# Patient Record
Sex: FEMALE
[De-identification: names, ages, dates, MRNs, and addresses within clinical notes are randomized per-mention and may not be internally consistent; named-entity substitution may affect disease eponyms.]

---

## 2018-02-04 ENCOUNTER — HOSPITAL ENCOUNTER (EMERGENCY)
Dept: HOSPITAL 14 - H.ER | Age: 33
LOS: 1 days | Discharge: HOME | End: 2018-02-05
Payer: SELF-PAY
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
## 2018-02-04 DIAGNOSIS — F10.129: Primary | ICD-10-CM

## 2018-02-04 PROCEDURE — 99283 EMERGENCY DEPT VISIT LOW MDM: CPT

## 2018-02-04 PROCEDURE — 82948 REAGENT STRIP/BLOOD GLUCOSE: CPT

## 2018-02-04 PROCEDURE — 81025 URINE PREGNANCY TEST: CPT

## 2018-02-04 NOTE — ED PDOC
HPI: Psych/Substance Abuse


Time Seen by Provider: 02/04/18 23:04


Chief Complaint (Nursing): Alcohol Ingestion


Chief Complaint (Provider): etoh


History Per: Patient, EMS


Additional Complaint(s): 


32-year-old female arrives to emergency department acutely intoxicated. Patient 

was found lying on sidewalk asleep. She is arousable to painful stimuli upon 

arrival to ED.  





Past Medical History


Reviewed: Historical Data, Nursing Documentation, Vital Signs, Unable To Obtain


Vital Signs: 





 Last Vital Signs











Temp  97.6 F   02/04/18 22:58


 


Pulse  107 H  02/04/18 22:58


 


Resp  16   02/04/18 22:58


 


BP  118/77   02/04/18 22:58


 


Pulse Ox  100   02/04/18 22:58














- Family History


Family History: States: No Known Family Hx





- Allergies


Allergies/Adverse Reactions: 


 Allergies











Allergy/AdvReac Type Severity Reaction Status Date / Time


 


aspirin Allergy  ANAPHYLAXIS Verified 02/04/18 23:02


 


onion Allergy  ANAPHYLAXIS Verified 02/05/18 04:45














Review of Systems


ROS Statement: Except As Marked, All Systems Reviewed And Found Negative


Psych: Positive for: Other (etoh)





Physical Exam





- Reviewed


Nursing Documentation Reviewed: Yes


Vital Signs Reviewed: Yes





- Physical Exam


Appears: Positive for: Well, Non-toxic, No Acute Distress


Skin: Negative for: Rash


Eye Exam: Positive for: Normal appearance


Cardiovascular/Chest: Positive for: Regular Rate, Rhythm


Respiratory: Positive for: Normal Breath Sounds.  Negative for: Respiratory 

Distress


Neurologic/Psych: Positive for: Other (Intoxicated, responds to painful stimuli)





- ECG


O2 Sat by Pulse Oximetry: 100


Pulse Ox Interpretation: Normal





Medical Decision Making


Medical Decision Making: 


Impression: Acute alcohol intoxication





Plan:


Glucose POC


BAL





Glucose POC:  99


BAL: 239





1:00 am: Patient is asleep, arousable, vital signs are stable.





3:00 am: Patient is asleep, still intoxicated, slightly more alert, vital signs 

stable





4:30 am: patient is awake, alert, asking for her cell phone and belongings.  

She has steady gait and is asking to go home.  Patient is stable for discharge.





Disposition





- Clinical Impression


Clinical Impression: 


 Alcohol intoxication








- Patient ED Disposition


Is Patient to be Admitted: No





- Disposition


Referrals: 


[facility] [Outside]


Disposition: Routine/Home


Disposition Time: 04:49


Condition: STABLE


Instructions:  Alcohol Intoxication (ED)


Forms:  [facility] (English)

## 2018-02-05 VITALS
SYSTOLIC BLOOD PRESSURE: 100 MMHG | HEART RATE: 95 BPM | DIASTOLIC BLOOD PRESSURE: 50 MMHG | TEMPERATURE: 98.1 F | RESPIRATION RATE: 14 BRPM

## 2018-02-05 VITALS — OXYGEN SATURATION: 100 %
